# Patient Record
Sex: FEMALE | Race: WHITE | NOT HISPANIC OR LATINO | Employment: OTHER | URBAN - METROPOLITAN AREA
[De-identification: names, ages, dates, MRNs, and addresses within clinical notes are randomized per-mention and may not be internally consistent; named-entity substitution may affect disease eponyms.]

---

## 2017-01-25 ENCOUNTER — LAB VISIT (OUTPATIENT)
Dept: LAB | Facility: HOSPITAL | Age: 82
End: 2017-01-25
Attending: OBSTETRICS & GYNECOLOGY
Payer: MEDICARE

## 2017-01-25 DIAGNOSIS — C56.9 OVARIAN CANCER, UNSPECIFIED LATERALITY: ICD-10-CM

## 2017-01-25 LAB — CANCER AG125 SERPL-ACNC: 36 U/ML

## 2017-01-25 PROCEDURE — 86304 IMMUNOASSAY TUMOR CA 125: CPT

## 2017-01-25 PROCEDURE — 36415 COLL VENOUS BLD VENIPUNCTURE: CPT

## 2017-01-26 ENCOUNTER — OFFICE VISIT (OUTPATIENT)
Dept: GYNECOLOGIC ONCOLOGY | Facility: CLINIC | Age: 82
End: 2017-01-26
Payer: MEDICARE

## 2017-01-26 VITALS
SYSTOLIC BLOOD PRESSURE: 186 MMHG | HEIGHT: 62 IN | HEART RATE: 66 BPM | DIASTOLIC BLOOD PRESSURE: 76 MMHG | BODY MASS INDEX: 28.44 KG/M2 | WEIGHT: 154.56 LBS

## 2017-01-26 DIAGNOSIS — C56.9 OVARIAN CANCER, UNSPECIFIED LATERALITY: Primary | ICD-10-CM

## 2017-01-26 DIAGNOSIS — R97.1 ELEVATED CANCER ANTIGEN 125 (CA-125): ICD-10-CM

## 2017-01-26 PROCEDURE — 99214 OFFICE O/P EST MOD 30 MIN: CPT | Mod: S$PBB,,, | Performed by: OBSTETRICS & GYNECOLOGY

## 2017-01-26 PROCEDURE — 99214 OFFICE O/P EST MOD 30 MIN: CPT | Mod: PBBFAC | Performed by: OBSTETRICS & GYNECOLOGY

## 2017-01-26 PROCEDURE — 99999 PR PBB SHADOW E&M-EST. PATIENT-LVL IV: CPT | Mod: PBBFAC,,, | Performed by: OBSTETRICS & GYNECOLOGY

## 2017-01-26 RX ORDER — PREDNISOLONE ACETATE 10 MG/ML
SUSPENSION/ DROPS OPHTHALMIC
COMMUNITY
Start: 2017-01-23

## 2017-01-26 RX ORDER — PREDNISONE 10 MG/1
TABLET ORAL
COMMUNITY
Start: 2017-01-12 | End: 2020-02-06

## 2017-01-26 RX ORDER — CLONAZEPAM 0.5 MG/1
TABLET ORAL
COMMUNITY
Start: 2016-10-26 | End: 2020-02-06

## 2017-01-26 RX ORDER — HYDROCODONE BITARTRATE AND ACETAMINOPHEN 5; 325 MG/1; MG/1
TABLET ORAL
Refills: 0 | COMMUNITY
Start: 2016-12-23 | End: 2020-02-06

## 2017-01-26 RX ORDER — LOVASTATIN 20 MG/1
TABLET ORAL
COMMUNITY
Start: 2017-01-10 | End: 2020-02-06

## 2017-01-26 RX ORDER — EPINEPHRINE 0.22MG
100 AEROSOL WITH ADAPTER (ML) INHALATION DAILY
COMMUNITY

## 2017-01-26 RX ORDER — CITALOPRAM 10 MG/1
TABLET ORAL
COMMUNITY
Start: 2016-12-13 | End: 2020-02-06

## 2017-01-26 RX ORDER — FLUTICASONE PROPIONATE 50 MCG
SPRAY, SUSPENSION (ML) NASAL
COMMUNITY
Start: 2017-01-12

## 2017-01-26 NOTE — PROGRESS NOTES
"Subjective:       Patient ID: Darion Chow is a 82 y.o. female.    Chief Complaint: Ovarian Cancer (12 mth )    HPI     Patient comes in today for follow up of ovarian cancer.   Her  is 36. Last year in Jan 2016 it was 32. Follow up in April 2016 was 25.   She had an elevation in 2012 that was 51. Negative CT scan at that time.   CXR is unchanged from bronchiectasis.      Mammgram: Nov 2015: normal per patient. Had breast exam with her breast surgeon in [place].      Her oncologic history is:   FIGO Stage IIC grade 3 Ovarian Cancer. She has been treated surgerically in March 2005. She completed Cycles of chemotherapy with taxol and carboplatin in June 2005 and completed taxotere consolidation in July 2006. She has had a mildly elevated  in April 2012 of 51. She then had a CT scan of the abdomen and pelvis in April 2012 which was normal.   Subsequently, a follow up  in July 2012 was done and it was 20. In September 2012 it was 15.       Review of Systems   Constitutional: Negative for chills, fatigue and fever.   Respiratory: Negative for cough, shortness of breath and wheezing.    Cardiovascular: Negative for chest pain, palpitations and leg swelling.   Gastrointestinal: Negative for abdominal pain, constipation, diarrhea, nausea and vomiting.   Genitourinary: Negative for difficulty urinating, dysuria, frequency, genital sores, hematuria, urgency, vaginal bleeding, vaginal discharge and vaginal pain.   Musculoskeletal: Positive for arthralgias.   Neurological: Negative for weakness.   Hematological: Negative for adenopathy. Does not bruise/bleed easily.   Psychiatric/Behavioral: The patient is not nervous/anxious.        Objective:       Visit Vitals    BP (!) 186/76    Pulse 66    Ht 5' 2" (1.575 m)    Wt 70.1 kg (154 lb 8.7 oz)    BMI 28.27 kg/m2       Physical Exam   Constitutional: She is oriented to person, place, and time. She appears well-developed and well-nourished.   HENT: "   Head: Normocephalic and atraumatic.   Eyes: No scleral icterus.   Neck: Neck supple. No tracheal deviation present. No thyroid mass and no thyromegaly present.   Cardiovascular: Normal rate and regular rhythm.    Pulmonary/Chest: Effort normal and breath sounds normal. She has no wheezes.   Abdominal: Soft. She exhibits no distension and no mass. There is no hepatosplenomegaly. There is no tenderness. There is no rebound and no guarding.   Genitourinary:   Genitourinary Comments: Bimanual exam:  Vulva: no lesions. Normal appearance  Urethra: Normal size and location. No lesions  Bladder: No masses or tenderness.  Vagina: normal mucosa. No lesion  Cervix: absent.   Uterus: absent.  Adnexa: no masses.  Rectovaginal: No posterior cul de sac thickening or nodularity.  Rectal: no masses. Nontender. Normal tone.      Musculoskeletal: She exhibits no edema or tenderness.   Lymphadenopathy:     She has no cervical adenopathy.     She has no axillary adenopathy.        Right: No inguinal and no supraclavicular adenopathy present.        Left: No inguinal and no supraclavicular adenopathy present.   Neurological: She is alert and oriented to person, place, and time.   Skin: Skin is warm and dry.   Psychiatric: She has a normal mood and affect. Her behavior is normal. Judgment and thought content normal.       Assessment:       1. Ovarian cancer, unspecified laterality    2. Elevated cancer antigen 125 (CA-125)        Plan:   Ovarian cancer, unspecified laterality  -     ; Future; Expected date: 4/26/17  -     ; Future; Expected date: 1/26/18    Elevated cancer antigen 125 (CA-125)    Will repeat in 3 months and if negative, then RTC in 1 year with .

## 2017-01-26 NOTE — MR AVS SNAPSHOT
[facility] - GYN Oncology  [address]  [facility] [address]  Phone: [phone]                  Darion Chow   2017 10:45 AM   Office Visit    Description:  Female : 1934   Provider:  Mac Martinez MD   Department:  [facility] - GYN Oncology           Reason for Visit     Ovarian Cancer           Diagnoses this Visit        Comments    Ovarian cancer, unspecified laterality    -  Primary            To Do List           Future Appointments        Provider Department Dept Phone    2017 10:45 AM Mac Martinez MD [facility] - GYN Oncology [phone]      Goals (5 Years of Data)     None      Follow-Up and Disposition     Return in about 1 year (around 2018).      [facility]     [facility] -  Assistance  Registered nurses in the [facility] provide clinical advisement, health education, appointment booking, and other advisory services.  Call for this free service at [phone].             Medications           Message regarding Medications     Verify the changes and/or additions to your medication regime listed below are the same as discussed with your clinician today.  If any of these changes or additions are incorrect, please notify your healthcare provider.        STOP taking these medications     amlodipine (NORVASC) 10 MG tablet Take 10 mg by mouth once daily.           Verify that the below list of medications is an accurate representation of the medications you are currently taking.  If none reported, the list may be blank. If incorrect, please contact your healthcare provider. Carry this list with you in case of emergency.           Current Medications     albuterol (ACCUNEB) 1.25 mg/3 mL nebulizer solution Take 1 ampule by nebulization daily as needed.      alprazolam (XANAX) 0.25 MG tablet Take 0.25 mg by mouth nightly as needed.      bisoprolol-hydrochlorothiazide 2.5-6.25 mg (ZIAC) 2.5-6.25 mg Tab Take 1 tablet by mouth  2 (two) times daily.    calcium carbonate (OS-JAJA) 600 mg (1,500 mg) Tab Take 600 mg by mouth 2 (two) times daily with meals.      cholecalciferol, vitamin D3, (VITAMIN D3) 400 unit Cap Take by mouth.      ciclesonide 80 mcg/actuation HFAA Inhale 160 mcg into the lungs once daily.     citalopram (CELEXA) 10 MG tablet     clonazePAM (KLONOPIN) 0.5 MG tablet     coenzyme Q10 (CO Q-10) 100 mg capsule Take 100 mg by mouth once daily.    esomeprazole (NEXIUM) 40 MG capsule Take 40 mg by mouth before breakfast.      estradiol (VAGIFEM) 10 mcg Tab Use twice weekly    fish oil-omega-3 fatty acids 300-1,000 mg capsule Take 2 g by mouth once daily.      fluticasone (FLONASE) 50 mcg/actuation nasal spray     FLUTICASONE/VILANTEROL (BREO ELLIPTA INHL) Inhale 1 puff into the lungs.    glucosamine-chondroitin 500-400 mg tablet Take 1 tablet by mouth 2 (two) times daily.      guaifenesin (MUCINEX) 600 mg 12 hr tablet Take 1,200 mg by mouth once daily.      hydrocodone-acetaminophen 5-325mg (NORCO) 5-325 mg per tablet TK 1 T PO  Q 4 TO 6 H    ibuprofen (ADVIL,MOTRIN) 100 MG tablet Take 100 mg by mouth every 6 (six) hours as needed.      ipratropium (ATROVENT) 0.02 % nebulizer solution Take 500 mcg by nebulization daily as needed.      losartan-hydrochlorothiazide (HYZAAR) 50-12.5 mg per tablet Take 0.5 tablets by mouth once daily.      loteprednol (LOTEMAX) 0.5 % ophthalmic suspension Place 1 drop into both eyes 2 (two) times daily.    lovastatin (MEVACOR) 20 MG tablet     multivitamin capsule Take 1 capsule by mouth once daily.      omalizumab (XOLAIR) 150 mg injection Inject into the skin every 28 days.      prednisoLONE acetate (PRED FORTE) 1 % DrpS     predniSONE (DELTASONE) 10 MG tablet     valsartan (DIOVAN) 320 MG tablet Take 160 mg by mouth once daily.            Clinical Reference Information           Vital Signs - Last Recorded  Most recent update: 1/26/2017 10:12 AM by Bindu Calderon MA    BP Pulse Ht Wt BMI    (!)  "186/76 66 5' 2" (1.575 m) 70.1 kg (154 lb 8.7 oz) 28.27 kg/m2      Blood Pressure          Most Recent Value    BP  (!)  186/76      Allergies as of 1/26/2017     Bactrim [Sulfamethoxazole-trimethoprim]    Ciprofloxacin    Keflex [Cephalexin]      Immunizations Administered on Date of Encounter - 1/26/2017     None      Orders Placed During Today's Visit     Future Labs/Procedures Expected by Expires      4/26/2017 1/26/2018      1/26/2018 1/26/2019      "

## 2017-05-05 ENCOUNTER — TELEPHONE (OUTPATIENT)
Dept: GYNECOLOGIC ONCOLOGY | Facility: CLINIC | Age: 82
End: 2017-05-05

## 2017-05-05 NOTE — TELEPHONE ENCOUNTER
----- Message from Renae Montanez sent at 5/5/2017  8:08 AM CDT -----  Darion Chow said she is taking antibiotics for her UTI/pt wants to know will this affect her ca125 results/pt can be reached at        [facility]# [id number]

## 2017-05-08 ENCOUNTER — LAB VISIT (OUTPATIENT)
Dept: LAB | Facility: HOSPITAL | Age: 82
End: 2017-05-08
Attending: OBSTETRICS & GYNECOLOGY
Payer: MEDICARE

## 2017-05-08 DIAGNOSIS — C56.9 OVARIAN CANCER, UNSPECIFIED LATERALITY: ICD-10-CM

## 2017-05-08 LAB — CANCER AG125 SERPL-ACNC: 34 U/ML

## 2017-05-08 PROCEDURE — 36415 COLL VENOUS BLD VENIPUNCTURE: CPT

## 2017-05-08 PROCEDURE — 86304 IMMUNOASSAY TUMOR CA 125: CPT

## 2017-05-10 ENCOUNTER — TELEPHONE (OUTPATIENT)
Dept: GYNECOLOGIC ONCOLOGY | Facility: CLINIC | Age: 82
End: 2017-05-10

## 2017-05-10 NOTE — TELEPHONE ENCOUNTER
----- Message from Mac Martinez MD sent at 5/10/2017 11:49 AM CDT -----  Patient informed of  result. Essentially stable. Slightly elevated.

## 2017-05-15 ENCOUNTER — TELEPHONE (OUTPATIENT)
Dept: GYNECOLOGIC ONCOLOGY | Facility: CLINIC | Age: 82
End: 2017-05-15

## 2017-05-15 NOTE — TELEPHONE ENCOUNTER
----- Message from Renae Montanez sent at 5/15/2017  8:32 AM CDT -----  Darion Chow calling for lab results from last week/can be reached at

## 2017-11-29 ENCOUNTER — TELEPHONE (OUTPATIENT)
Dept: GYNECOLOGIC ONCOLOGY | Facility: CLINIC | Age: 82
End: 2017-11-29

## 2017-11-29 DIAGNOSIS — C56.9 MALIGNANT NEOPLASM OF OVARY, UNSPECIFIED LATERALITY: Primary | ICD-10-CM

## 2017-11-29 NOTE — TELEPHONE ENCOUNTER
----- Message from Elo Liu sent at 11/29/2017  3:01 PM CST -----  Contact: DENYS RECEE [[id number]]  _x  1st Request  _  2nd Request  _  3rd Request        Who: DENYS REECE [[id number]]    Why: patient states she would like a call back in regards to setting up an appt    What Number to Call Back: [phone]    When to Expect a call back: (Before the end of the day)   -- if call after 3:00 call back will be tomorrow.

## 2017-11-29 NOTE — TELEPHONE ENCOUNTER
Spoke with pt. Pt scheduled for labs 1/29/18 at 10:30 and Dr. Martinez 1/30/18 at 11:00. Pt says thank you. Appt letter mailed to pt.

## 2018-02-19 ENCOUNTER — LAB VISIT (OUTPATIENT)
Dept: LAB | Facility: HOSPITAL | Age: 83
End: 2018-02-19
Attending: OBSTETRICS & GYNECOLOGY
Payer: MEDICARE

## 2018-02-19 DIAGNOSIS — C56.9 MALIGNANT NEOPLASM OF OVARY, UNSPECIFIED LATERALITY: ICD-10-CM

## 2018-02-19 LAB — CANCER AG125 SERPL-ACNC: 43 U/ML

## 2018-02-19 PROCEDURE — 86304 IMMUNOASSAY TUMOR CA 125: CPT

## 2018-02-19 PROCEDURE — 36415 COLL VENOUS BLD VENIPUNCTURE: CPT

## 2018-04-03 ENCOUNTER — OFFICE VISIT (OUTPATIENT)
Dept: GYNECOLOGIC ONCOLOGY | Facility: CLINIC | Age: 83
End: 2018-04-03
Payer: MEDICARE

## 2018-04-03 VITALS
BODY MASS INDEX: 27.06 KG/M2 | WEIGHT: 147.06 LBS | DIASTOLIC BLOOD PRESSURE: 67 MMHG | HEART RATE: 85 BPM | SYSTOLIC BLOOD PRESSURE: 141 MMHG | HEIGHT: 62 IN

## 2018-04-03 DIAGNOSIS — R97.1 ELEVATED CANCER ANTIGEN 125 (CA-125): ICD-10-CM

## 2018-04-03 DIAGNOSIS — R91.8 LUNG NODULES: ICD-10-CM

## 2018-04-03 DIAGNOSIS — C56.9 MALIGNANT NEOPLASM OF OVARY, UNSPECIFIED LATERALITY: Primary | ICD-10-CM

## 2018-04-03 DIAGNOSIS — Z12.89 ENCOUNTER FOR PELVIC SCREENING FOR CANCER: ICD-10-CM

## 2018-04-03 PROCEDURE — 99999 PR PBB SHADOW E&M-EST. PATIENT-LVL V: CPT | Mod: PBBFAC,,, | Performed by: OBSTETRICS & GYNECOLOGY

## 2018-04-03 PROCEDURE — 99214 OFFICE O/P EST MOD 30 MIN: CPT | Mod: S$PBB,,, | Performed by: OBSTETRICS & GYNECOLOGY

## 2018-04-03 PROCEDURE — 99215 OFFICE O/P EST HI 40 MIN: CPT | Mod: PBBFAC | Performed by: OBSTETRICS & GYNECOLOGY

## 2018-04-03 RX ORDER — PREDNISONE 20 MG/1
TABLET ORAL
COMMUNITY
Start: 2018-03-07 | End: 2020-02-06

## 2018-04-03 RX ORDER — ALBUTEROL SULFATE 90 UG/1
1 AEROSOL, METERED RESPIRATORY (INHALATION) EVERY 6 HOURS PRN
COMMUNITY

## 2018-04-03 RX ORDER — AMLODIPINE BESYLATE 5 MG/1
TABLET ORAL
COMMUNITY
Start: 2018-03-12

## 2018-04-03 RX ORDER — AZITHROMYCIN 250 MG/1
TABLET, FILM COATED ORAL
COMMUNITY
Start: 2018-03-07 | End: 2020-02-06

## 2018-04-03 RX ORDER — CEFUROXIME AXETIL 250 MG/1
TABLET ORAL
COMMUNITY
Start: 2018-01-24 | End: 2020-02-06

## 2018-04-03 RX ORDER — METHYLPREDNISOLONE 4 MG/1
TABLET ORAL
COMMUNITY
Start: 2018-02-07 | End: 2020-02-06

## 2018-04-03 RX ORDER — CEPHRADINE 500 MG
CAPSULE ORAL DAILY
COMMUNITY
End: 2020-02-06 | Stop reason: SDUPTHER

## 2018-04-03 RX ORDER — AMOXICILLIN AND CLAVULANATE POTASSIUM 500; 125 MG/1; MG/1
TABLET, FILM COATED ORAL
COMMUNITY
Start: 2018-02-07

## 2018-04-03 RX ORDER — ASPIRIN 81 MG/1
81 TABLET ORAL DAILY
COMMUNITY
End: 2020-02-06

## 2018-04-03 RX ORDER — MONTELUKAST SODIUM 10 MG/1
TABLET ORAL
COMMUNITY
Start: 2018-03-23 | End: 2020-02-06

## 2018-04-03 NOTE — PROGRESS NOTES
"Subjective:       Patient ID: Darion Chow is a 83 y.o. female.    Chief Complaint: Ovarian Cancer and Follow-up (1 yr)    HPI     Patient comes in today for follow up of ovarian cancer.   Her  is 43. Last year in Jan 2017 it was 36 .May 2017 it was 34.  Jan 2016 it was 32. Follow up in April 2016 was 25.   She had an elevation in 2012 that was 51. Negative CT scan at that time.     Chronically mildly elevated . Thought to be due to chronic bronchiectasis.       Recent hospitalization for bronchitis. Chronic bronchiectasis.          Mammgram: Nov 2017: normal per patient. Had breast exam with her breast surgeon in [place].      Her oncologic history is:   FIGO Stage IIC grade 3 Ovarian Cancer. She has been treated surgerically in March 2005. She completed Cycles of chemotherapy with taxol and carboplatin in June 2005 and completed taxotere consolidation in July 2006. She has had a mildly elevated  in April 2012 of 51. She then had a CT scan of the abdomen and pelvis in April 2012 which was normal.   Subsequently, a follow up  in July 2012 was done and it was 20. In September 2012 it was 15.     Chronically elevated CA 125s.       Review of Systems   Constitutional: Positive for fatigue. Negative for chills and fever.   Respiratory: Negative for cough, shortness of breath and wheezing.    Cardiovascular: Negative for chest pain, palpitations and leg swelling.   Gastrointestinal: Negative for abdominal pain, constipation, diarrhea, nausea and vomiting.   Genitourinary: Negative for difficulty urinating, dysuria, frequency, genital sores, hematuria, urgency, vaginal bleeding, vaginal discharge and vaginal pain.   Neurological: Negative for weakness.   Hematological: Negative for adenopathy. Does not bruise/bleed easily.   Psychiatric/Behavioral: The patient is not nervous/anxious.        Objective:   BP (!) 141/67   Pulse 85   Ht 5' 2" (1.575 m)   Wt 66.7 kg (147 lb 0.8 oz)   BMI 26.90 " kg/m²      Physical Exam   Constitutional: She is oriented to person, place, and time. She appears well-developed and well-nourished.   HENT:   Head: Normocephalic and atraumatic.   Eyes: No scleral icterus.   Neck: Neck supple. No tracheal deviation present. No thyroid mass and no thyromegaly present.   Cardiovascular: Normal rate and regular rhythm.    Pulmonary/Chest: Effort normal and breath sounds normal. She has no wheezes.   Abdominal: Soft. She exhibits no distension and no mass. There is no hepatosplenomegaly. There is no tenderness. There is no rebound and no guarding.   Genitourinary:   Genitourinary Comments: Bimanual exam:  Vulva: no lesions. Normal appearance  Urethra: Normal size and location. No lesions  Bladder: No masses or tenderness.  Vagina: normal mucosa. No lesion  Cervix: absent.   Uterus: absent.  Adnexa: no masses.  Rectovaginal: No posterior cul de sac thickening or nodularity.  Rectal: no masses. Nontender. Normal tone.      Musculoskeletal: She exhibits no edema or tenderness.   Lymphadenopathy:     She has no cervical adenopathy.     She has no axillary adenopathy.        Right: No inguinal and no supraclavicular adenopathy present.        Left: No inguinal and no supraclavicular adenopathy present.   Neurological: She is alert and oriented to person, place, and time.   Skin: Skin is warm and dry.   Psychiatric: She has a normal mood and affect. Her behavior is normal. Judgment and thought content normal.       Assessment:       1. Malignant neoplasm of ovary, unspecified laterality    2. Elevated cancer antigen 125 (CA-125)    3. Lung nodules    4. Encounter for pelvic screening for cancer        Plan:   Malignant neoplasm of ovary, unspecified laterality  Repeat  in 2 months. If continues to be the same or to climb will get CT scan of CAP. If  has decreased then will see again in 6 months.     -     ; Future; Expected date: 06/04/2018  -     ; Future; Expected  date: 10/03/2018    Elevated cancer antigen 125 (CA-125)  -     ; Future; Expected date: 06/04/2018  -     ; Future; Expected date: 10/03/2018    Lung nodules    Encounter for pelvic screening for cancer

## 2018-06-05 ENCOUNTER — LAB VISIT (OUTPATIENT)
Dept: LAB | Facility: HOSPITAL | Age: 83
End: 2018-06-05
Attending: OBSTETRICS & GYNECOLOGY
Payer: MEDICARE

## 2018-06-05 DIAGNOSIS — C56.9 MALIGNANT NEOPLASM OF OVARY, UNSPECIFIED LATERALITY: ICD-10-CM

## 2018-06-05 DIAGNOSIS — R97.1 ELEVATED CANCER ANTIGEN 125 (CA-125): ICD-10-CM

## 2018-06-05 PROCEDURE — 86304 IMMUNOASSAY TUMOR CA 125: CPT

## 2018-06-05 PROCEDURE — 36415 COLL VENOUS BLD VENIPUNCTURE: CPT

## 2018-06-06 LAB — CANCER AG125 SERPL-ACNC: 33 U/ML

## 2018-10-08 ENCOUNTER — LAB VISIT (OUTPATIENT)
Dept: LAB | Facility: HOSPITAL | Age: 83
End: 2018-10-08
Attending: OBSTETRICS & GYNECOLOGY
Payer: MEDICARE

## 2018-10-08 DIAGNOSIS — R97.1 ELEVATED CANCER ANTIGEN 125 (CA-125): ICD-10-CM

## 2018-10-08 DIAGNOSIS — C56.9 MALIGNANT NEOPLASM OF OVARY, UNSPECIFIED LATERALITY: ICD-10-CM

## 2018-10-08 LAB — CANCER AG125 SERPL-ACNC: 35 U/ML

## 2018-10-08 PROCEDURE — 86304 IMMUNOASSAY TUMOR CA 125: CPT

## 2018-10-08 PROCEDURE — 36415 COLL VENOUS BLD VENIPUNCTURE: CPT

## 2018-10-09 ENCOUNTER — TELEPHONE (OUTPATIENT)
Dept: GYNECOLOGIC ONCOLOGY | Facility: CLINIC | Age: 83
End: 2018-10-09

## 2018-10-09 NOTE — TELEPHONE ENCOUNTER
----- Message from Carolann Lopez NP sent at 10/8/2018  6:43 PM CDT -----  Ms. Chow is due for 6 month follow up with Dr. Martinez. Can we contact her to schedule?  Thank you,  Carolann

## 2019-01-29 ENCOUNTER — TELEPHONE (OUTPATIENT)
Dept: GYNECOLOGIC ONCOLOGY | Facility: CLINIC | Age: 84
End: 2019-01-29

## 2019-01-29 DIAGNOSIS — C56.9 MALIGNANT NEOPLASM OF OVARY, UNSPECIFIED LATERALITY: Primary | ICD-10-CM

## 2019-02-03 NOTE — PROGRESS NOTES
Subjective:       Patient ID: Darion Chow is a 84 y.o. female.    Chief Complaint: Ovarian Cancer and Follow-up    HPI   Patient comes in today for follow up of ovarian cancer.   Her  is 37 which is in keeping with prior results.     She had an elevation in 2012 that was 51. Negative CT scan at that time.      Chronically mildly elevated . Thought to be due to chronic bronchiectasis.         Mammgram: Nov 2018: normal per patient. Has breast exam with her breast surgeon (Dr. Sellers) in [place].      Her oncologic history is:   FIGO Stage IIC grade 3 Ovarian Cancer. She has been treated surgerically in March 2005. She completed Cycles of chemotherapy with taxol and carboplatin in June 2005 and completed taxotere consolidation in July 2006. She has had a mildly elevated  in April 2012 of 51. She then had a CT scan of the abdomen and pelvis in April 2012 which was normal.   Subsequently, a follow up  in July 2012 was done and it was 20. In September 2012 it was 15.      Chronically elevated CA 125s.      Review of Systems   Constitutional: Positive for activity change (after fall and broken wrist). Negative for appetite change, chills, diaphoresis, fatigue, fever and unexpected weight change.   Respiratory: Positive for cough (lingering bronchitis). Negative for chest tightness, shortness of breath and wheezing.    Cardiovascular: Negative for chest pain, palpitations and leg swelling.   Gastrointestinal: Negative for abdominal distention, abdominal pain, blood in stool, constipation, diarrhea, nausea and vomiting.   Genitourinary: Negative for difficulty urinating, dysuria, flank pain, frequency, hematuria, pelvic pain, vaginal bleeding, vaginal discharge and vaginal pain.   Musculoskeletal: Positive for gait problem and joint swelling (knees). Negative for arthralgias and back pain.   Skin: Negative for color change and rash.   Neurological: Negative for dizziness, weakness, numbness and  "headaches.   Hematological: Negative for adenopathy.   Psychiatric/Behavioral: Negative for confusion and sleep disturbance. The patient is not nervous/anxious.        Objective:   BP (!) 153/73   Pulse 76   Ht 5' 2" (1.575 m)   Wt 69.7 kg (153 lb 10.6 oz)   BMI 28.10 kg/m²      Physical Exam   Constitutional: She is oriented to person, place, and time. She appears well-developed and well-nourished. No distress.   HENT:   Head: Normocephalic.   Eyes: No scleral icterus.   Neck: Normal range of motion.   Cardiovascular: Normal rate and regular rhythm.   No murmur heard.  Pulmonary/Chest: Effort normal and breath sounds normal. No respiratory distress. She has no wheezes.   Abdominal: Soft. She exhibits no distension and no mass. There is no tenderness. There is no rebound and no guarding.   Genitourinary:   Genitourinary Comments: Bimanual exam:  Vulva: no lesions. Normal appearance  Urethra: Normal size and location. No lesions  Bladder: No masses or tenderness.  Vagina: normal mucosa. No lesion  Cervix: absent.   Uterus: absent.  Adnexa: no masses.  Rectovaginal: deferred today   Musculoskeletal: Normal range of motion. She exhibits no edema.   Neurological: She is alert and oriented to person, place, and time.   Skin: Skin is warm and dry. No rash noted. She is not diaphoretic. No pallor.   Psychiatric: She has a normal mood and affect. Her behavior is normal.   Nursing note and vitals reviewed.      Assessment:       1. Malignant neoplasm of ovary, unspecified laterality        Plan:   Malignant neoplasm of ovary, unspecified laterality  -     ; Future; Expected date: 02/05/2020    CARMELITA on today's exam  RTC in 1 year with     "

## 2019-02-04 ENCOUNTER — TELEPHONE (OUTPATIENT)
Dept: GYNECOLOGIC ONCOLOGY | Facility: CLINIC | Age: 84
End: 2019-02-04

## 2019-02-04 ENCOUNTER — LAB VISIT (OUTPATIENT)
Dept: LAB | Facility: HOSPITAL | Age: 84
End: 2019-02-04
Attending: OBSTETRICS & GYNECOLOGY
Payer: MEDICARE

## 2019-02-04 DIAGNOSIS — C56.9 MALIGNANT NEOPLASM OF OVARY, UNSPECIFIED LATERALITY: ICD-10-CM

## 2019-02-04 LAB — CANCER AG125 SERPL-ACNC: 37 U/ML

## 2019-02-04 PROCEDURE — 86304 IMMUNOASSAY TUMOR CA 125: CPT

## 2019-02-04 PROCEDURE — 36415 COLL VENOUS BLD VENIPUNCTURE: CPT

## 2019-02-05 ENCOUNTER — OFFICE VISIT (OUTPATIENT)
Dept: GYNECOLOGIC ONCOLOGY | Facility: CLINIC | Age: 84
End: 2019-02-05
Payer: MEDICARE

## 2019-02-05 VITALS
HEIGHT: 62 IN | HEART RATE: 76 BPM | WEIGHT: 153.69 LBS | DIASTOLIC BLOOD PRESSURE: 73 MMHG | SYSTOLIC BLOOD PRESSURE: 153 MMHG | BODY MASS INDEX: 28.28 KG/M2

## 2019-02-05 DIAGNOSIS — C56.9 MALIGNANT NEOPLASM OF OVARY, UNSPECIFIED LATERALITY: Primary | ICD-10-CM

## 2019-02-05 PROCEDURE — 99999 PR PBB SHADOW E&M-EST. PATIENT-LVL III: ICD-10-PCS | Mod: PBBFAC,,, | Performed by: OBSTETRICS & GYNECOLOGY

## 2019-02-05 PROCEDURE — 99213 OFFICE O/P EST LOW 20 MIN: CPT | Mod: PBBFAC | Performed by: OBSTETRICS & GYNECOLOGY

## 2019-02-05 PROCEDURE — 99214 PR OFFICE/OUTPT VISIT, EST, LEVL IV, 30-39 MIN: ICD-10-PCS | Mod: S$PBB,,, | Performed by: OBSTETRICS & GYNECOLOGY

## 2019-02-05 PROCEDURE — 99999 PR PBB SHADOW E&M-EST. PATIENT-LVL III: CPT | Mod: PBBFAC,,, | Performed by: OBSTETRICS & GYNECOLOGY

## 2019-02-05 PROCEDURE — 99214 OFFICE O/P EST MOD 30 MIN: CPT | Mod: S$PBB,,, | Performed by: OBSTETRICS & GYNECOLOGY

## 2019-02-05 RX ORDER — PROMETHAZINE HYDROCHLORIDE AND DEXTROMETHORPHAN HYDROBROMIDE 6.25; 15 MG/5ML; MG/5ML
SYRUP ORAL
COMMUNITY
Start: 2019-01-14 | End: 2020-02-06

## 2019-02-05 RX ORDER — CEFDINIR 300 MG/1
CAPSULE ORAL
COMMUNITY
Start: 2019-01-15 | End: 2020-02-06

## 2019-02-05 RX ORDER — KETOROLAC TROMETHAMINE 5 MG/ML
SOLUTION OPHTHALMIC
COMMUNITY
Start: 2019-01-10

## 2019-02-05 RX ORDER — ROSUVASTATIN CALCIUM 5 MG/1
TABLET, COATED ORAL
Refills: 8 | COMMUNITY
Start: 2019-01-21

## 2019-02-05 RX ORDER — COLCHICINE 0.6 MG/1
TABLET, FILM COATED ORAL
Refills: 1 | COMMUNITY
Start: 2018-11-12 | End: 2020-02-06

## 2020-02-05 ENCOUNTER — LAB VISIT (OUTPATIENT)
Dept: LAB | Facility: HOSPITAL | Age: 85
End: 2020-02-05
Attending: NURSE PRACTITIONER
Payer: MEDICARE

## 2020-02-05 ENCOUNTER — TELEPHONE (OUTPATIENT)
Dept: ADMINISTRATIVE | Facility: OTHER | Age: 85
End: 2020-02-05

## 2020-02-05 DIAGNOSIS — C56.9 MALIGNANT NEOPLASM OF OVARY, UNSPECIFIED LATERALITY: ICD-10-CM

## 2020-02-05 LAB — CANCER AG125 SERPL-ACNC: 27 U/ML (ref 0–30)

## 2020-02-05 PROCEDURE — 36415 COLL VENOUS BLD VENIPUNCTURE: CPT

## 2020-02-05 PROCEDURE — 86304 IMMUNOASSAY TUMOR CA 125: CPT

## 2020-02-06 ENCOUNTER — LAB VISIT (OUTPATIENT)
Dept: LAB | Facility: HOSPITAL | Age: 85
End: 2020-02-06
Payer: MEDICARE

## 2020-02-06 ENCOUNTER — OFFICE VISIT (OUTPATIENT)
Dept: GYNECOLOGIC ONCOLOGY | Facility: CLINIC | Age: 85
End: 2020-02-06
Payer: MEDICARE

## 2020-02-06 VITALS
SYSTOLIC BLOOD PRESSURE: 185 MMHG | DIASTOLIC BLOOD PRESSURE: 81 MMHG | BODY MASS INDEX: 28.44 KG/M2 | WEIGHT: 154.56 LBS | HEIGHT: 62 IN | HEART RATE: 68 BPM

## 2020-02-06 DIAGNOSIS — C56.9 MALIGNANT NEOPLASM OF OVARY, UNSPECIFIED LATERALITY: Primary | ICD-10-CM

## 2020-02-06 DIAGNOSIS — R97.1 ELEVATED CANCER ANTIGEN 125 (CA-125): ICD-10-CM

## 2020-02-06 DIAGNOSIS — C56.9 MALIGNANT NEOPLASM OF OVARY, UNSPECIFIED LATERALITY: ICD-10-CM

## 2020-02-06 DIAGNOSIS — Z12.31 SCREENING MAMMOGRAM, ENCOUNTER FOR: ICD-10-CM

## 2020-02-06 PROCEDURE — 99214 PR OFFICE/OUTPT VISIT, EST, LEVL IV, 30-39 MIN: ICD-10-PCS | Mod: S$PBB,,, | Performed by: OBSTETRICS & GYNECOLOGY

## 2020-02-06 PROCEDURE — 99999 PR PBB SHADOW E&M-EST. PATIENT-LVL III: ICD-10-PCS | Mod: PBBFAC,,, | Performed by: OBSTETRICS & GYNECOLOGY

## 2020-02-06 PROCEDURE — 99999 PR PBB SHADOW E&M-EST. PATIENT-LVL III: CPT | Mod: PBBFAC,,, | Performed by: OBSTETRICS & GYNECOLOGY

## 2020-02-06 PROCEDURE — 99213 OFFICE O/P EST LOW 20 MIN: CPT | Mod: PBBFAC | Performed by: OBSTETRICS & GYNECOLOGY

## 2020-02-06 PROCEDURE — 99214 OFFICE O/P EST MOD 30 MIN: CPT | Mod: S$PBB,,, | Performed by: OBSTETRICS & GYNECOLOGY

## 2020-02-06 RX ORDER — CYCLOSPORINE 0.5 MG/ML
EMULSION OPHTHALMIC
COMMUNITY
Start: 2019-11-16

## 2020-02-06 RX ORDER — AMITRIPTYLINE HYDROCHLORIDE 10 MG/1
TABLET, FILM COATED ORAL
COMMUNITY
Start: 2020-01-14 | End: 2020-02-06

## 2020-02-06 NOTE — PROGRESS NOTES
"Subjective:       Patient ID: Darion Chow is a 85 y.o. female.    Chief Complaint: Annual Exam (12mos fu)    HPI     Patient comes in today for follow up of ovarian cancer.     Feb 2020:  is 27 which is lower than it has been in the past.     She had an elevation in 2012 that was 51. Negative CT scan at that time.      Chronically mildly elevated . Thought to be due to chronic bronchiectasis.         Mammgram: Nov 2019: normal per patient. Has breast exam with her breast surgeon (Dr. Sellers) in [place].      Her oncologic history is:   FIGO Stage IIC grade 3 Ovarian Cancer. She has been treated surgerically in March 2005. She completed Cycles of chemotherapy with taxol and carboplatin in June 2005 and completed taxotere consolidation in July 2006. She has had a mildly elevated  in April 2012 of 51. She then had a CT scan of the abdomen and pelvis in April 2012 which was normal.   Subsequently, a follow up  in July 2012 was done and it was 20. In September 2012 it was 15.      Chronically elevated CA 125s.     Review of Systems   Constitutional: Negative for chills, fatigue and fever.   Respiratory: Negative for cough, shortness of breath and wheezing.    Cardiovascular: Negative for chest pain, palpitations and leg swelling.   Gastrointestinal: Positive for diarrhea (occasional). Negative for abdominal pain, constipation, nausea and vomiting.   Genitourinary: Negative for difficulty urinating, dysuria, frequency, genital sores, hematuria, urgency, vaginal bleeding, vaginal discharge and vaginal pain.   Musculoskeletal: Positive for arthralgias (bilateral knee ).   Neurological: Negative for weakness.   Hematological: Negative for adenopathy. Does not bruise/bleed easily.   Psychiatric/Behavioral: The patient is not nervous/anxious.        Objective:   BP (!) 185/81 (BP Location: Left arm, Patient Position: Sitting, BP Method: Medium (Automatic))   Pulse 68   Ht 5' 2" (1.575 m)   Wt " 70.1 kg (154 lb 8.7 oz)   BMI 28.27 kg/m²      Physical Exam   Constitutional: She is oriented to person, place, and time. She appears well-developed and well-nourished.   HENT:   Head: Normocephalic and atraumatic.   Eyes: No scleral icterus.   Neck: Neck supple. No tracheal deviation present. No thyroid mass and no thyromegaly present.   Cardiovascular: Normal rate and regular rhythm.   Pulmonary/Chest: Effort normal and breath sounds normal. She has no wheezes.   Abdominal: Soft. She exhibits no distension and no mass. There is no hepatosplenomegaly. There is no tenderness. There is no rebound and no guarding.   Genitourinary:   Genitourinary Comments: Bimanual exam:  Vulva: no lesions. Normal appearance  Urethra: Normal size and location. No lesions  Bladder: No masses or tenderness.  Vagina: normal mucosa. No lesion  Cervix: absent.   Uterus: absent.  Adnexa: no masses.  Rectovaginal: Not performed     Musculoskeletal: She exhibits no edema or tenderness.   Lymphadenopathy:     She has no cervical adenopathy.     She has no axillary adenopathy.        Right: No inguinal and no supraclavicular adenopathy present.        Left: No inguinal and no supraclavicular adenopathy present.   Neurological: She is alert and oriented to person, place, and time.   Skin: Skin is warm and dry.   Psychiatric: She has a normal mood and affect. Her behavior is normal. Judgment and thought content normal.       Assessment:       1. Malignant neoplasm of ovary, unspecified laterality    2. Elevated cancer antigen 125 (CA-125)    3. Screening mammogram, encounter for        Plan:   Malignant neoplasm of ovary, unspecified laterality   CARMELITA   RTC in 1 year.     -     ; Future; Expected date: 02/08/2021  -     BRCAnalysis w/ myRISK; Future; Expected date: 02/06/2020    Elevated cancer antigen 125 (CA-125)  -     ; Future; Expected date: 02/08/2021    Screening mammogram, encounter for  She will see her breast surgeon for  exam. They have discussed that she has reached the age for MMG is not necessary.

## 2020-03-24 ENCOUNTER — TELEPHONE (OUTPATIENT)
Dept: GYNECOLOGIC ONCOLOGY | Facility: CLINIC | Age: 85
End: 2020-03-24

## 2020-03-24 NOTE — TELEPHONE ENCOUNTER
Patient informed of [employer] results.  She has no clinically significant mutation identified.  However she does have a variant of uncertain significance.  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

## 2021-05-04 ENCOUNTER — PATIENT MESSAGE (OUTPATIENT)
Dept: RESEARCH | Facility: HOSPITAL | Age: 86
End: 2021-05-04

## 2021-07-13 ENCOUNTER — TELEPHONE (OUTPATIENT)
Dept: GYNECOLOGIC ONCOLOGY | Facility: CLINIC | Age: 86
End: 2021-07-13